# Patient Record
Sex: FEMALE | URBAN - METROPOLITAN AREA
[De-identification: names, ages, dates, MRNs, and addresses within clinical notes are randomized per-mention and may not be internally consistent; named-entity substitution may affect disease eponyms.]

---

## 2018-05-08 ENCOUNTER — AMBULATORY - HEALTHEAST (OUTPATIENT)
Dept: MULTI SPECIALTY CLINIC | Facility: CLINIC | Age: 38
End: 2018-05-08

## 2018-05-08 LAB
HPV_EXT - HISTORICAL: NORMAL
PAP SMEAR - HIM PATIENT REPORTED: NORMAL

## 2019-02-08 ENCOUNTER — RECORDS - HEALTHEAST (OUTPATIENT)
Dept: ADMINISTRATIVE | Facility: OTHER | Age: 39
End: 2019-02-08

## 2019-06-28 ENCOUNTER — OFFICE VISIT - HEALTHEAST (OUTPATIENT)
Dept: FAMILY MEDICINE | Facility: CLINIC | Age: 39
End: 2019-06-28

## 2019-06-28 ENCOUNTER — COMMUNICATION - HEALTHEAST (OUTPATIENT)
Dept: TELEHEALTH | Facility: CLINIC | Age: 39
End: 2019-06-28

## 2019-06-28 DIAGNOSIS — E66.3 OVERWEIGHT: ICD-10-CM

## 2019-06-28 DIAGNOSIS — R06.83 SNORING: ICD-10-CM

## 2019-06-28 DIAGNOSIS — F17.219 CIGARETTE NICOTINE DEPENDENCE WITH NICOTINE-INDUCED DISORDER: ICD-10-CM

## 2019-06-28 DIAGNOSIS — N39.41 URGE INCONTINENCE OF URINE: ICD-10-CM

## 2019-06-28 DIAGNOSIS — F33.1 MAJOR DEPRESSIVE DISORDER, RECURRENT EPISODE, MODERATE (H): ICD-10-CM

## 2019-06-28 DIAGNOSIS — J34.89 SORE IN NOSTRIL: ICD-10-CM

## 2019-06-28 LAB
ALBUMIN UR-MCNC: NEGATIVE MG/DL
APPEARANCE UR: CLEAR
BILIRUB UR QL STRIP: NEGATIVE
COLOR UR AUTO: YELLOW
GLUCOSE UR STRIP-MCNC: NEGATIVE MG/DL
HGB UR QL STRIP: NEGATIVE
KETONES UR STRIP-MCNC: NEGATIVE MG/DL
LEUKOCYTE ESTERASE UR QL STRIP: NEGATIVE
NITRATE UR QL: NEGATIVE
PH UR STRIP: 7 [PH] (ref 5–8)
SP GR UR STRIP: 1.02 (ref 1–1.03)
UROBILINOGEN UR STRIP-ACNC: NORMAL

## 2019-06-28 RX ORDER — MUPIROCIN 20 MG/G
OINTMENT TOPICAL
Qty: 22 G | Refills: 0 | Status: SHIPPED | OUTPATIENT
Start: 2019-06-28

## 2019-06-28 ASSESSMENT — MIFFLIN-ST. JEOR: SCORE: 1414.67

## 2019-07-02 ENCOUNTER — COMMUNICATION - HEALTHEAST (OUTPATIENT)
Dept: FAMILY MEDICINE | Facility: CLINIC | Age: 39
End: 2019-07-02

## 2019-07-10 ENCOUNTER — RECORDS - HEALTHEAST (OUTPATIENT)
Dept: ADMINISTRATIVE | Facility: OTHER | Age: 39
End: 2019-07-10

## 2019-07-19 ENCOUNTER — COMMUNICATION - HEALTHEAST (OUTPATIENT)
Dept: FAMILY MEDICINE | Facility: CLINIC | Age: 39
End: 2019-07-19

## 2019-07-19 DIAGNOSIS — F33.1 MAJOR DEPRESSIVE DISORDER, RECURRENT EPISODE, MODERATE (H): ICD-10-CM

## 2019-07-24 ENCOUNTER — OFFICE VISIT - HEALTHEAST (OUTPATIENT)
Dept: FAMILY MEDICINE | Facility: CLINIC | Age: 39
End: 2019-07-24

## 2019-07-24 DIAGNOSIS — J06.9 ACUTE UPPER RESPIRATORY INFECTION: ICD-10-CM

## 2019-09-12 ENCOUNTER — COMMUNICATION - HEALTHEAST (OUTPATIENT)
Dept: FAMILY MEDICINE | Facility: CLINIC | Age: 39
End: 2019-09-12

## 2019-09-20 ENCOUNTER — OFFICE VISIT - HEALTHEAST (OUTPATIENT)
Dept: SLEEP MEDICINE | Facility: CLINIC | Age: 39
End: 2019-09-20

## 2019-09-20 DIAGNOSIS — F17.219 CIGARETTE NICOTINE DEPENDENCE WITH NICOTINE-INDUCED DISORDER: ICD-10-CM

## 2019-09-20 DIAGNOSIS — R06.83 SNORING: ICD-10-CM

## 2019-09-20 DIAGNOSIS — F12.10 MARIJUANA ABUSE: ICD-10-CM

## 2019-09-20 DIAGNOSIS — G47.10 EXCESSIVE SLEEPINESS: ICD-10-CM

## 2019-09-20 DIAGNOSIS — R06.81 WITNESSED APNEIC SPELLS: ICD-10-CM

## 2019-09-20 RX ORDER — ZOLPIDEM TARTRATE 5 MG/1
TABLET ORAL
Qty: 1 TABLET | Refills: 0 | Status: SHIPPED | OUTPATIENT
Start: 2019-09-20

## 2019-09-20 ASSESSMENT — MIFFLIN-ST. JEOR: SCORE: 1410.59

## 2019-09-30 ENCOUNTER — RECORDS - HEALTHEAST (OUTPATIENT)
Dept: GENERAL RADIOLOGY | Facility: CLINIC | Age: 39
End: 2019-09-30

## 2019-09-30 ENCOUNTER — OFFICE VISIT - HEALTHEAST (OUTPATIENT)
Dept: FAMILY MEDICINE | Facility: CLINIC | Age: 39
End: 2019-09-30

## 2019-09-30 DIAGNOSIS — B36.0 TINEA VERSICOLOR: ICD-10-CM

## 2019-09-30 DIAGNOSIS — E66.09 CLASS 1 OBESITY DUE TO EXCESS CALORIES WITHOUT SERIOUS COMORBIDITY WITH BODY MASS INDEX (BMI) OF 30.0 TO 30.9 IN ADULT: ICD-10-CM

## 2019-09-30 DIAGNOSIS — E03.8 SUBCLINICAL HYPOTHYROIDISM: ICD-10-CM

## 2019-09-30 DIAGNOSIS — F33.1 MAJOR DEPRESSIVE DISORDER, RECURRENT EPISODE, MODERATE (H): ICD-10-CM

## 2019-09-30 DIAGNOSIS — R04.2 HEMOPTYSIS: ICD-10-CM

## 2019-09-30 DIAGNOSIS — F17.219 CIGARETTE NICOTINE DEPENDENCE WITH NICOTINE-INDUCED DISORDER: ICD-10-CM

## 2019-09-30 DIAGNOSIS — E66.811 CLASS 1 OBESITY DUE TO EXCESS CALORIES WITHOUT SERIOUS COMORBIDITY WITH BODY MASS INDEX (BMI) OF 30.0 TO 30.9 IN ADULT: ICD-10-CM

## 2019-09-30 LAB — TSH SERPL DL<=0.005 MIU/L-ACNC: 1.55 UIU/ML (ref 0.3–5)

## 2019-09-30 RX ORDER — KETOCONAZOLE 20 MG/G
CREAM TOPICAL
Qty: 60 G | Refills: 0 | Status: SHIPPED | OUTPATIENT
Start: 2019-09-30

## 2019-09-30 ASSESSMENT — ANXIETY QUESTIONNAIRES
GAD7 TOTAL SCORE: 9
IF YOU CHECKED OFF ANY PROBLEMS ON THIS QUESTIONNAIRE, HOW DIFFICULT HAVE THESE PROBLEMS MADE IT FOR YOU TO DO YOUR WORK, TAKE CARE OF THINGS AT HOME, OR GET ALONG WITH OTHER PEOPLE: SOMEWHAT DIFFICULT
7. FEELING AFRAID AS IF SOMETHING AWFUL MIGHT HAPPEN: SEVERAL DAYS
6. BECOMING EASILY ANNOYED OR IRRITABLE: MORE THAN HALF THE DAYS
2. NOT BEING ABLE TO STOP OR CONTROL WORRYING: SEVERAL DAYS
3. WORRYING TOO MUCH ABOUT DIFFERENT THINGS: SEVERAL DAYS
5. BEING SO RESTLESS THAT IT IS HARD TO SIT STILL: NOT AT ALL
4. TROUBLE RELAXING: MORE THAN HALF THE DAYS
1. FEELING NERVOUS, ANXIOUS, OR ON EDGE: MORE THAN HALF THE DAYS

## 2019-09-30 ASSESSMENT — PATIENT HEALTH QUESTIONNAIRE - PHQ9: SUM OF ALL RESPONSES TO PHQ QUESTIONS 1-9: 9

## 2019-11-30 ENCOUNTER — RECORDS - HEALTHEAST (OUTPATIENT)
Dept: SLEEP MEDICINE | Facility: CLINIC | Age: 39
End: 2019-11-30

## 2019-11-30 ENCOUNTER — RECORDS - HEALTHEAST (OUTPATIENT)
Dept: ADMINISTRATIVE | Facility: OTHER | Age: 39
End: 2019-11-30

## 2019-11-30 DIAGNOSIS — R06.83 SNORING: ICD-10-CM

## 2019-12-04 ENCOUNTER — COMMUNICATION - HEALTHEAST (OUTPATIENT)
Dept: SLEEP MEDICINE | Facility: CLINIC | Age: 39
End: 2019-12-04

## 2020-01-13 ENCOUNTER — OFFICE VISIT - HEALTHEAST (OUTPATIENT)
Dept: SLEEP MEDICINE | Facility: CLINIC | Age: 40
End: 2020-01-13

## 2020-01-13 DIAGNOSIS — G25.81 WILLIS-EKBOM SYNDROME: ICD-10-CM

## 2020-01-13 DIAGNOSIS — G47.10 HYPERSOMNIA: ICD-10-CM

## 2020-01-13 DIAGNOSIS — R06.83 SNORING: ICD-10-CM

## 2020-01-13 DIAGNOSIS — R79.9 ABNORMAL BLOOD CHEMISTRY: ICD-10-CM

## 2020-01-13 ASSESSMENT — MIFFLIN-ST. JEOR: SCORE: 1409.23

## 2020-02-07 ENCOUNTER — COMMUNICATION - HEALTHEAST (OUTPATIENT)
Dept: FAMILY MEDICINE | Facility: CLINIC | Age: 40
End: 2020-02-07

## 2020-02-07 DIAGNOSIS — F33.1 MAJOR DEPRESSIVE DISORDER, RECURRENT EPISODE, MODERATE (H): ICD-10-CM

## 2020-04-23 ENCOUNTER — COMMUNICATION - HEALTHEAST (OUTPATIENT)
Dept: FAMILY MEDICINE | Facility: CLINIC | Age: 40
End: 2020-04-23

## 2020-04-27 ENCOUNTER — OFFICE VISIT - HEALTHEAST (OUTPATIENT)
Dept: FAMILY MEDICINE | Facility: CLINIC | Age: 40
End: 2020-04-27

## 2020-04-27 DIAGNOSIS — F33.1 MAJOR DEPRESSIVE DISORDER, RECURRENT EPISODE, MODERATE (H): ICD-10-CM

## 2020-04-27 RX ORDER — BUPROPION HYDROCHLORIDE 150 MG/1
150 TABLET ORAL EVERY MORNING
Qty: 90 TABLET | Refills: 4 | Status: SHIPPED | OUTPATIENT
Start: 2020-04-27

## 2020-04-27 ASSESSMENT — PATIENT HEALTH QUESTIONNAIRE - PHQ9: SUM OF ALL RESPONSES TO PHQ QUESTIONS 1-9: 11

## 2020-10-16 ENCOUNTER — COMMUNICATION - HEALTHEAST (OUTPATIENT)
Dept: FAMILY MEDICINE | Facility: CLINIC | Age: 40
End: 2020-10-16

## 2020-10-16 DIAGNOSIS — F33.1 MAJOR DEPRESSIVE DISORDER, RECURRENT EPISODE, MODERATE (H): ICD-10-CM

## 2021-05-26 ASSESSMENT — PATIENT HEALTH QUESTIONNAIRE - PHQ9
SUM OF ALL RESPONSES TO PHQ QUESTIONS 1-9: 11
SUM OF ALL RESPONSES TO PHQ QUESTIONS 1-9: 9

## 2021-05-28 ASSESSMENT — ANXIETY QUESTIONNAIRES: GAD7 TOTAL SCORE: 9

## 2021-05-30 NOTE — PATIENT INSTRUCTIONS - HE
To help with sleep and hot flashes at night- try switching to wellbutrin XL once a day in the  Morning.      Antibiotic ointment to right nostril until gone twice a day.  If still bothering you send me a [employer] message and will get you in with ENT.    Check urine and if okay consider pill for overactive bladder like Ditropan XL.

## 2021-05-30 NOTE — PROGRESS NOTES
[facility] Office Visit    Chief Complaint:  Chief Complaint   Patient presents with     [facility]     sleep trouble, skin tag in right nostil         Assessment/Plan:  1. Major depressive disorder, recurrent episode, moderate (H)  Currently on wellbutrin SR 150mg two times a day and taking 2nd dose at bedtime but often forgetting.  Having difficulty maintaining sleep and night sweats.  Start by switching to wellbutrin xl 150mg daily in am for smoother med dosing to see if this will help with sleep maintenance and night sweats and smoking cessation.  Sleep consult regarding sleep apnea.    - buPROPion (WELLBUTRIN XL) 150 MG 24 hr tablet; Take 1 tablet (150 mg total) by mouth every morning.  Dispense: 90 tablet; Refill: 1    2. Urge incontinence of urine  Check ua for any issues that may be contributing. If normal consider meds such as ditropan, etc and f/u in 3 months.    - Urinalysis-UC if Indicated    3. Snoring  Concerning for VIRY with paused breathing pattern as reported by her boyfriend, interrupted sleep, etc.    - Ambulatory referral to Sleep Medicine    4. Sore in nostril  Nothing obvious on exam today.  Trial of bactroban two times a day until tube is gone and if sensation continues ENT referral for direct visualization.    - mupirocin (BACTROBAN) 2 % ointment; Apply to right nostril two times a day  Dispense: 22 g; Refill: 0    5. Cigarette nicotine dependence with nicotine-induced disorder  Pt trying to quit but hard.  Switch wellbutrin to xL for daily consistent dosing and improved adherence.  F/u in 3 months.        Return in about 3 months (around 9/28/2019) for Recheck.  The following are part of a depression follow up plan for the patient:  implementation of measures to provide psychological support  The following high BMI interventions were performed this visit: encouragement to exercise and lifestyle education regarding diet  I have counseled the patient for tobacco  "cessation and the follow up will occur  at the next visit.    Patient Education/AVS:  Patient Instructions   To help with sleep and hot flashes at night- try switching to wellbutrin XL once a day in the  Morning.      Antibiotic ointment to right nostril until gone twice a day.  If still bothering you send me a mychart message and will get you in with ENT.    Check urine and if okay consider pill for overactive bladder like Ditropan XL.       HPI:   Herminia Cheng is a 39 y.o. female c/o [facility] care.  Concerned about urinary incontinence, night sweats, sleep and skin tag in nose.      Mental health is \"okay\"- bad at taking     Gets sudden urges to urinate and sometimes can breath through it and it will pass and other times she can't wait and has to run to the bathroom as her bladder starts to empty.  Usually just a little wet underwear as she goes to the bathroom but sometimes has emptied her entire bladder.  Worse when under more stress.  Getting worse over past year.  Never been pregnant.  Has to get up several times a night to pee and when she wakes up her shirt is soaked with sweat.  Finance has commented that she has always been a snorer but lately starting to stop/pause breathing.      Feels like there is a bump in right nostril- sore and irritated for past several months.  Will heal up and then there will always be a little bump and then it seems to get more irritated and then will flake off.  No bleeding but feels raw/painful/stinging sensation with any manipulation of her nose, blowing nose, sneezing, etc. Not in healthcare work.      ROS:  Constitutional, CV, Resp, GI, , MSK, skin, neuro, psych all negative except as outlined in the HPI above and patient denies any other symptoms.      Old Records-1:Care Everywhere consent signed and records obtained  Lab tests reviewed-1: last pap 2015    Physical Exam:  /76 (Patient Site: Left Arm, Patient Position: Sitting, Cuff Size: Adult Regular)   " "Pulse 80   Resp 16   Ht 5' 3.25\" (1.607 m)   Wt 169 lb (76.7 kg)   LMP 06/11/2019 (Exact Date)   BMI 29.70 kg/m   Body mass index is 29.7 kg/m . Patient's last menstrual period was 06/11/2019 (exact date).  Vital signs reviewed  Wt Readings from Last 3 Encounters:   06/28/19 169 lb (76.7 kg)     Social History     Tobacco Use   Smoking Status Current Every Day Smoker     Packs/day: 0.50     Types: Cigarettes   Smokeless Tobacco Never Used     Social History     Substance and Sexual Activity   Sexual Activity Yes     Partners: Male     Birth control/protection: Rhythm     No data recorded  PHQ-9 Total Score: 10 (6/28/2019 10:32 AM)    PHQ-2 Total Score: 2 (6/28/2019 10:32 AM)    No data recorded    All normal as below except abnormalities include: pt appears well.  Good eye contact and normal affect and mood noted today.  Engaged in history and planning.  No obvious sore or lump in right nostril noted on visualization today.  Remainder of exam grossly normal.    General is a  39 y.o. female sitting comfortably in no apparent distress.   HEENT:  TM are clear bilaterally.  Eye, nasal, oral exams within normal   Neck: Supple without lymphadenopathy or thyromegally  CV: Regular rate and rhythm S1S2 without rubs, murmurs or gallops,   Lungs: Clear to auscultation bilaterally  Extremities: Warm, No Edema, 2+ Pedal and radial pulses bilaterally  Skin: No lesions or rashes noted  Neuro/MSK: Able to ambulate around the exam room with equal movement, strength and normal coordination of the upper and lower extremeties symmetrically    No results found for this or any previous visit.    Med list and active problem list reviewed and updated as part of this encounter    Current Outpatient Medications on File Prior to Visit   Medication Sig Dispense Refill     sertraline (ZOLOFT) 50 MG tablet        [DISCONTINUED] buPROPion (WELLBUTRIN SR) 150 MG 12 hr tablet Take 150 mg by mouth.       No current facility-administered " medications on file prior to visit.          Caitlin Gong MD

## 2021-06-01 NOTE — PROGRESS NOTES
"Dear  Caitlin Gong Md  [address] [place] [id number]    Thank you for the opportunity to participate in the care of  Herminia Cheng.    Herminia Cheng is sent by Caitlin Gong MD for a sleep consultation regarding snoring and witnessed apneas.     She has a history of depression, subclinical hypothyroidism, and nicotine use.    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  Alarm is set for 5:30 - 6:30 but not up until 7 - 8 AM.  Usually getting into bed by 11:30 (aiming for 10:30 PM) and asleep quickly (within 15 - 20 minutes).     On weekends in bed around midnight and up around 10 AM and feels better.    Sleep Disordered Breathing - Snoring is loud. Patient confirms snort arousals and witnessed apneas.   Has nocturia 2 - 3+ times per night.  Occasional nocturnal GERD.   Upon waking feels tired.     Patient was counseled on the importance of driving while alert, to pull over if drowsy, or nap before getting into the vehicle if sleepy.    Movement/Behaviors - Nonsense somniloquy.  Childhood somnambulism.  No sleep related eating.  No dream enactment behavior.   She reports bruxism.    Patient denies typical restless legs syndrome symptoms.    Alcohol use - 1 - 2 nights per week will drink. Anywhere from 2 -3 to as many as 6 drinks.  Caffeine intake - coffee 1 - 2 /day. Last caffeine intake is usually in the morning.  Tobacco exposure - 1/2 ppd  Illicit substances - Daily and \"all day.\"  Rare mushrooms.    Lives with fiance and roommate.  Has 2 pets, dog and cat.     Does have family history of snoring and Obstructive Sleep Apnea \"on whole dad's side.\"    Past Medical History:  Past Medical History:   Diagnosis Date     Thyroid nodule 8/30/2016    Ultrasound and bx were wnl       Problem List:  Patient Active Problem List    Diagnosis Date Noted     Urge incontinence of urine 06/28/2019     Snoring 06/28/2019     Nicotine dependence 06/28/2019     Overweight 06/28/2019 "     Major depressive disorder, recurrent episode, moderate (H) 02/13/2018     Subclinical hypothyroidism 08/25/2016     Hemorrhoids, internal 08/18/2016        Past Surgical History:  Past Surgical History:   Procedure Laterality Date     APPENDECTOMY  1993        Meds:  Current Outpatient Medications   Medication Sig Dispense Refill     buPROPion (WELLBUTRIN XL) 150 MG 24 hr tablet Take 1 tablet (150 mg total) by mouth every morning. 90 tablet 1     mupirocin (BACTROBAN) 2 % ointment Apply to right nostril two times a day 22 g 0     sertraline (ZOLOFT) 50 MG tablet Take 1 tablet (50 mg total) by mouth daily. 90 tablet 1     No current facility-administered medications for this visit.         Allergies:  Patient has no known allergies.     Social History:  Social History     Socioeconomic History     Marital status: Single     Spouse name: Not on file     Number of children: 0     Years of education: Not on file     Highest education level: Not on file   Occupational History     Occupation:  at construction company   Social Needs     Financial resource strain: Not very hard     Food insecurity:     Worry: Never true     Inability: Never true     Transportation needs:     Medical: No     Non-medical: No   Tobacco Use     Smoking status: Current Every Day Smoker     Packs/day: 0.50     Types: Cigarettes     Smokeless tobacco: Never Used   Substance and Sexual Activity     Alcohol use: Not Currently     Drug use: Yes     Sexual activity: Yes     Partners: Male     Birth control/protection: Rhythm   Lifestyle     Physical activity:     Days per week: Not on file     Minutes per session: Not on file     Stress: Not on file   Relationships     Social connections:     Talks on phone: Not on file     Gets together: Not on file     Attends [employer]ism service: Not on file     Active member of club or organization: Not on file     Attends meetings of clubs or organizations: Not on file     Relationship status: Not  "on file     Intimate partner violence:     Fear of current or ex partner: Not on file     Emotionally abused: Not on file     Physically abused: Not on file     Forced sexual activity: Not on file   Other Topics Concern     Not on file   Social History Narrative    Lives with fiance and roomate        Family History:  Family History   Problem Relation Age of Onset     Diabetes Mother      Memory loss Mother      Hyperlipidemia Father      Mental illness Father      Fibromyalgia Sister      Migraines Sister      No Medical Problems Brother      Stroke Maternal Grandmother      No Medical Problems Sister         Review of Systems: Weight gain; cough; diarrhea; constipation; excessive urination; rashes  A complete review of systems reviewed by me is negative with the exeption of what has been mentioned in the history of present illness.    Physical Exam:  /72 (Patient Site: Right Arm, Patient Position: Sitting, Cuff Size: Adult Regular)   Pulse 88   Ht 5' 3.25\" (1.607 m)   Wt 168 lb 1.6 oz (76.2 kg)   SpO2 98%   BMI 29.54 kg/m    General appearance: No apparent distress, well groomed.    HEENT:   Head: Normocephalic, atraumatic.  Eyes: PERRL  Nose: Nares patent.  No exudate.  No septal deviation noted.  Mouth: Teeth: Normal   Tongue: Normal  Oropharynx:  Mallampati Classification: 2+ B    Tonsils: Normal    Uvula: Normal    Neck: Supple without bruit.  Thyroid prominence (2016 US with small nodules)    Cardiac: Regular rate and rhythm.  No murmurs.  Radial pulses are strong and symmetric.  Pulmonary: Symmetric air movement, lungs clear to auscultation bilaterally.  Musculoskeletal: No edema noted.  No clubbing or cyanosis.  Skin: Warm, dry, intact.  Neurologic: Alert and oriented to person, place and time   Gait is normal.  Psychiatric: Affect pleasant.  Mood down.     Labs/Studies:  No results found for: WBC, HGB, HCT, MCV, PLT      Chemistry    No results found for: NA, K, CL, CO2, BUN, CREATININE, GLU No " results found for: CALCIUM, ALKPHOS, AST, ALT, BILITOT     No results found for: FERRITIN  No results found for: TSH  No results found for: HGBA1C    Epworths Sleepiness Scale 9/20/2019   Sitting and reading 3   Watching TV 2   Sitting, inactive in a public place (e.g. a theatre or a meeting) 1   As a passenger in a car for an hour without a break 3   Lying down to rest in the afternoon when circumstances permit 2   Sitting and talking to someone 0   Sitting quietly after a lunch without alcohol 2   In a car, while stopped for a few minutes in traffic 0   Total score 13     STOP BANG 9/20/2019   Do you snore loudly (louder than talking or loud enough to be heard through closed doors)? 1   Do you often feel tired, fatigued, or sleepy during daytime? 1   Has anyone observed you stop breathing in your sleep? 1   Do you have or are you being treated for high blood pressure? 0   BMI more than 35 kg/m2 0   Age over 50 years old? 0   Neck circumference greater than 16 inches? 0   Gender male? 0   Total Score 3     Assessment and Plan:  1. Snoring  I have a high suspicion for VIRY based on presence of snoring, witnessed apneas and ESS. We discussed pathophysiology of obstructive sleep apnea, testing with overnight polysomnography, and treatment modalities (CPAP only discussed at this visit). We discussed consequences of untreated Obstructive Sleep Apnea, such as markedly elevated risk for heart attack or stroke. Patient is interested in treatment and willing to undergo overnight sleep testing.    Home sleep testing is inappropriate for this patient due to lack of high pretest probability for moderate to severe sleep apnea.    Study has been ordered today.      In case of insomnia during the study:  one-time medication has been ordered.   - Split Night Sleep Study; Future  - zolpidem (AMBIEN) 5 MG tablet; Do not take this at home.  For use if needed during the sleep study. May take one tablet by mouth as directed once you are  in the Sleep Lab.  Dispense: 1 tablet; Refill: 0    2. Witnessed apneic spells  Concern for VIRY per above    3. Excessive sleepiness  Some insufficient sleep, and circadian misalignment with possible VIRY.    4. Cigarette nicotine dependence with nicotine-induced disorder  We discussed the importance of tobacco cessation, both due to overall health consequences, and also as how it relates to exacerbation of VIRY severity.  We discussed cessation strategies.  She was encouraged to discuss further strategies with her primary care provider.    5. Marijuana abuse  Discussed MJ effects on depression and weight as well as inflammatory nature of inhaled burning smoke and effects on airway patency.     Patient verbalized understanding of these issues, agrees with the plan and all questions were answered today. Patient was given an opportuntity to voice any other symptoms or concerns not listed above. Patient did not have any other symptoms or concerns.      MD TONI Shin Board Certified in Internal Medicine and Sleep Medicine  [facility].

## 2021-06-01 NOTE — PROGRESS NOTES
[facility] Office Visit    Chief Complaint:  Chief Complaint   Patient presents with     Follow-up     Rash     on back, comes and goes         Assessment/Plan:  1. Major depressive disorder, recurrent episode, moderate (H)  Moderately well controlled.  Continue meds and cognitive tx. Work on smoking cessation first and then THC   - buPROPion (WELLBUTRIN XL) 150 MG 24 hr tablet; Take 1 tablet (150 mg total) by mouth every morning.  Dispense: 90 tablet; Refill: 4  - sertraline (ZOLOFT) 50 MG tablet; Take 1 tablet (50 mg total) by mouth daily.  Dispense: 90 tablet; Refill: 4    2. Hemoptysis  Resolved.  Likely related to prolonged period of coughing due to viral illness vs mold exposure. Check CXR and if normal continue to monitor.    - XR Chest 2 Views; Future    3. Cigarette nicotine dependence with nicotine-induced disorder  Pt advised to quit smoking. She has set quit date for 11/1/19.  Continue wellbutrin.     4. Class 1 obesity due to excess calories without serious comorbidity with body mass index (BMI) of 30.0 to 30.9 in adult  Weight gain noted.  Pt will continue to work on health LSM and careful about eating to compensate for smoking.      5. Subclinical hypothyroidism  Still tired and anxious and weight gain- check TSH   - Thyroid Cascade    6. Tinea versicolor  Rash on back c/w tinea.  Treat as below.    - ketoconazole (NIZORAL) 2 % cream; Apply to upper back daily for 3 weeks  Dispense: 60 g; Refill: 0      Return in about 6 months (around 3/30/2020) for Recheck.  The following are part of a depression follow up plan for the patient:  implementation of measures to provide psychological support  The following high BMI interventions were performed this visit: encouragement to exercise and lifestyle education regarding diet  I have counseled the patient for tobacco cessation and the follow up will occur  at the next visit.    Patient Education/AVS:  There are no Patient Instructions on  "file for this visit.    HPI:   Herminia Cheng is a 39 y.o. female c/o f/u on mental health, recurrent cough and nicotine and THC use.  Rash on back- started early 2019.  Looks like hives that goes away and then turns in to white spots and comes back again.  Get itchy at times when red and raised and will last for 1-2 days.  Has occurred about 8 times when they get red/raised.  Only on upper back between shoulders.      Pt has had recurrent URI and cough on and off for past several months.  Does work in a basement office with some mold and did ask to move upstairs and cough did get better.  Wondering if rash may be related to this mold as well?  Had been coughing up blood tinged mucous but notes she had a very hard cough.      Sleep study scheduled next month but may have to reschedule to a change in jobs.     Mental health going \"alright\".  Forgetting to take meds 1-2 days a week on weekends.  Still getting night sweats but not as much now.  Can quit up to 4 days and has a quit date of 11/1/19.  Daily THC use.  Seeing therapist on regular basis.  Worried about what her anxiety will be off this med.      ROS:  Constitutional, CV, Resp, GI, , MSK, skin, neuro, psych all negative except as outlined in the HPI above and patient denies any other symptoms.      History summarized from1-2:sleep consult 9/20/19 reviewed- dx with snoring concerning for VIRY.  Home sleep test ordered.  Advised on smoking and THC cessation.    Labs- normal TSH 2/2019    Physical Exam:  /86 (Patient Site: Left Arm, Patient Position: Sitting, Cuff Size: Adult Large)   Pulse 69   Resp 16   Wt 172 lb (78 kg)   LMP 09/22/2019 (Exact Date)   SpO2 98%   BMI 30.23 kg/m   Body mass index is 30.23 kg/m . Patient's last menstrual period was 09/22/2019 (exact date).  Vital signs reviewed  Wt Readings from Last 3 Encounters:   09/30/19 172 lb (78 kg)   09/20/19 168 lb 1.6 oz (76.2 kg)   06/28/19 169 lb (76.7 kg)     Social History     Tobacco " Use   Smoking Status Current Every Day Smoker     Packs/day: 0.50     Types: Cigarettes   Smokeless Tobacco Never Used     Social History     Substance and Sexual Activity   Sexual Activity Yes     Partners: Male     Birth control/protection: Rhythm     No data recorded  PHQ-9 Total Score: 10 (6/28/2019 10:32 AM)    PHQ-2 Total Score: 2 (6/28/2019 10:32 AM)    No data recorded    All normal as below except abnormalities include: grossly normal exam today.  Good eye contact and affect noted.    General is a  39 y.o. female sitting comfortably in no apparent distress.   Neck: Supple without lymphadenopathy or thyromegally  CV: Regular rate and rhythm S1S2 without rubs, murmurs or gallops,   Lungs: Clear to auscultation bilaterally  Extremities: Warm, No Edema, 2+ Pedal and radial pulses bilaterally  Skin: No lesions or rashes noted  Neuro/MSK: Able to ambulate around the exam room with equal movement, strength and normal coordination of the upper and lower extremeties symmetrically    Results for orders placed or performed in visit on 06/28/19   Urinalysis-UC if Indicated   Result Value Ref Range    Color, UA Yellow Colorless, Yellow, Straw, Light Yellow    Clarity, UA Clear Clear    Glucose, UA Negative Negative    Bilirubin, UA Negative Negative    Ketones, UA Negative Negative    Specific Gravity, UA 1.020 1.005 - 1.030    Blood, UA Negative Negative    pH, UA 7.0 5.0 - 8.0    Protein, UA Negative Negative mg/dL    Urobilinogen, UA 0.2 E.U./dL 0.2 E.U./dL, 1.0 E.U./dL    Nitrite, UA Negative Negative    Leukocytes, UA Negative Negative       Med list and active problem list reviewed and updated as part of this encounter    Current Outpatient Medications on File Prior to Visit   Medication Sig Dispense Refill     mupirocin (BACTROBAN) 2 % ointment Apply to right nostril two times a day 22 g 0     zolpidem (AMBIEN) 5 MG tablet Do not take this at home.  For use if needed during the sleep study. May take one tablet by  mouth as directed once you are in the Sleep Lab. 1 tablet 0     [DISCONTINUED] buPROPion (WELLBUTRIN XL) 150 MG 24 hr tablet Take 1 tablet (150 mg total) by mouth every morning. 90 tablet 1     [DISCONTINUED] sertraline (ZOLOFT) 50 MG tablet Take 1 tablet (50 mg total) by mouth daily. 90 tablet 1     No current facility-administered medications on file prior to visit.          Caitlin Gong MD

## 2021-06-03 VITALS
DIASTOLIC BLOOD PRESSURE: 86 MMHG | RESPIRATION RATE: 16 BRPM | OXYGEN SATURATION: 98 % | WEIGHT: 172 LBS | BODY MASS INDEX: 30.23 KG/M2 | HEART RATE: 69 BPM | SYSTOLIC BLOOD PRESSURE: 116 MMHG

## 2021-06-03 VITALS — WEIGHT: 169 LBS | HEIGHT: 63 IN | BODY MASS INDEX: 29.95 KG/M2

## 2021-06-03 VITALS
HEART RATE: 88 BPM | HEIGHT: 63 IN | WEIGHT: 168.1 LBS | SYSTOLIC BLOOD PRESSURE: 104 MMHG | DIASTOLIC BLOOD PRESSURE: 72 MMHG | BODY MASS INDEX: 29.79 KG/M2 | OXYGEN SATURATION: 98 %

## 2021-06-04 VITALS
BODY MASS INDEX: 29.73 KG/M2 | HEIGHT: 63 IN | HEART RATE: 82 BPM | DIASTOLIC BLOOD PRESSURE: 78 MMHG | SYSTOLIC BLOOD PRESSURE: 110 MMHG | WEIGHT: 167.8 LBS | OXYGEN SATURATION: 98 %

## 2021-06-04 NOTE — TELEPHONE ENCOUNTER
Overnight polysomnography was reviewed.   The patient had mild snoring and periodic limb movement in sleep. I will wait until her upcoming appointment to discuss results and treatment options.     Thank you for allowing me to participate in the care of this patient. If you have any questions or concerns, please feel free to call me.

## 2021-06-07 NOTE — TELEPHONE ENCOUNTER
DOD, Please advise on mychart message below. Patient last seen on 09/30/19. Ok for telephone visit?

## 2021-06-07 NOTE — PROGRESS NOTES
"Herminia Cheng is a 40 y.o. female who is being evaluated via a billable video visit.      The patient has been notified of following:     \"This video visit will be conducted via a call between you and your physician/provider. We have found that certain health care needs can be provided without the need for an in-person physical exam.  This service lets us provide the care you need with a video conversation.  If a prescription is necessary we can send it directly to your pharmacy.  If lab work is needed we can place an order for that and you can then stop by our lab to have the test done at a later time.    Video visits are billed at different rates depending on your insurance coverage. Please reach out to your insurance provider with any questions.    If during the course of the call the physician/provider feels a video visit is not appropriate, you will not be charged for this service.\"    Patient has given verbal consent to a Video visit? Yes    Patient would like to receive their AVS by AVS Preference: Angela.    Patient would like the video invitation sent by: Text to cell phone: [phone]    Will anyone else be joining your video visit? No        Video Start Time: 2:42PM    Additional provider notes:  Patient in her car. Is at work and has to do video visits in car.  Wants to restart depression medications  Quit smoking with wellbutrin. Noticed it helped with depression.   Was wearing off and so increased dose?  Stopped taking both medications- \"outside influences\" giving her shame. Ex-chuck  Had been on wellbutrin and 50mg zofolt- stopped them. Weaned . Started forgetting them. Stopped taking after 3 days.   No side effects.   continueous therapy sessions for 6 years.   Broke up with chuck march 5th  Been on her own for last month.   Friends in the area. No family here.   She pulled the trigger with relationship but it turned out to be mutual  Mood is \"About the same as before taking he medicitons. \" " some days lower due to break up  For the most part feels stable abotu the break up.   emotional hurdles.   anxiety with work.   thoughts of harming herself - reaches out to therapist when this happens. Has had this before. Safety plan in place    ROS: + SI, no HI. + depression and + anxiety. neg for hallucinations     GENERAL: healthy, alert and no distress  EYES: Eyes grossly normal to inspection, conjunctivae and sclerae normal  RESP: no audible wheeze, cough, or visible cyanosis.  No visible retractions or increased work of breathing.  Able to speak fully in complete sentences.  NEURO: Cranial nerves grossly intact, mentation intact and speech normal  PSYCH: tearful a little during conversation when talking about chuck Hope was seen today for medication refill and anxiety.    Diagnoses and all orders for this visit:    Major depressive disorder, recurrent episode, moderate (H):  Will restart patient on her previous medications with plan laid out as below on how to restart. Contracted for safety. Continue therapy. F/u in 3 weeks with PCP or myself with video visit. Patient agreeable to plan.   -     buPROPion (WELLBUTRIN XL) 150 MG 24 hr tablet; Take 1 tablet (150 mg total) by mouth every morning.  -     sertraline (ZOLOFT) 50 MG tablet; Take 1 tablet (50 mg total) by mouth daily. For the first 3-4 days only take half a pill      Video-Visit Details    Type of service:  Video Visit    Video End Time (time video stopped): 2:54 PM  Originating Location (pt. Location): Car outside of work    Distant Location (provider location):  [facility]     Mode of Communication:  Video Conference via [employer]      Shonda Sue DO

## 2021-06-12 NOTE — TELEPHONE ENCOUNTER
Patient declined to schedule appt at this time. She stated that she is currently seeing a Psychiatry provider @ [facility] who will be managing this medication and does not need PCP to refill.  At this time, we will no longer make an attempt to schedule this appointment. Completing task.

## 2021-06-16 PROBLEM — R06.83 SNORING: Status: ACTIVE | Noted: 2019-06-28

## 2021-06-16 PROBLEM — F33.1 MAJOR DEPRESSIVE DISORDER, RECURRENT EPISODE, MODERATE (H): Status: ACTIVE | Noted: 2018-02-13

## 2021-06-16 PROBLEM — F17.200 NICOTINE DEPENDENCE: Status: ACTIVE | Noted: 2019-06-28

## 2021-06-16 PROBLEM — G25.81: Status: ACTIVE | Noted: 2020-01-13

## 2021-06-16 PROBLEM — E66.811 CLASS 1 OBESITY DUE TO EXCESS CALORIES WITHOUT SERIOUS COMORBIDITY WITH BODY MASS INDEX (BMI) OF 30.0 TO 30.9 IN ADULT: Status: ACTIVE | Noted: 2019-06-28

## 2021-06-16 PROBLEM — E66.09 CLASS 1 OBESITY DUE TO EXCESS CALORIES WITHOUT SERIOUS COMORBIDITY WITH BODY MASS INDEX (BMI) OF 30.0 TO 30.9 IN ADULT: Status: ACTIVE | Noted: 2019-06-28

## 2021-06-16 PROBLEM — N39.41 URGE INCONTINENCE OF URINE: Status: ACTIVE | Noted: 2019-06-28

## 2021-06-17 NOTE — PATIENT INSTRUCTIONS - HE
Patient Instructions by Caitlin Gong MD at 7/25/2019  6:12 PM     Author: Caitlin Gong MD Service: -- Author Type: Physician    Filed: 7/25/2019  6:15 PM Encounter Date: 7/24/2019 Status: Addendum    : Caitlin Gong MD (Physician)    Related Notes: Original Note by Caitlin Gong MD (Physician) filed at 7/25/2019  6:12 PM           Viral Upper Respiratory Illness (Adult)  You have a viral upper respiratory illness (URI), which is another term for the common cold. This illness is contagious during the first few days. It is spread through the air by coughing and sneezing. It may also be spread by direct contact (touching the sick person and then touching your own eyes, nose, or mouth). Frequent handwashing will decrease risk of spread. Most viral illnesses go away within 7 to 10 days with rest and simple home remedies. Sometimes the illness may last for several weeks. Antibiotics will not kill a virus, and they are generally not prescribed for this condition.    Home care    If symptoms are severe, rest at home for the first 2 to 3 days. When you resume activity, don't let yourself get too tired.    Avoid being exposed to cigarette smoke (yours or others).    You may use acetaminophen or ibuprofen to control pain and fever, unless another medicine was prescribed. If you have chronic liver or kidney disease, have ever had a stomach ulcer or gastrointestinal bleeding, or are taking blood-thinning medicines, talk with your healthcare provider before using these medicines. Aspirin should never be given to anyone under 18 years of age who is ill with a viral infection or fever. It may cause severe liver or brain damage.    Your appetite may be poor, so a light diet is fine. Avoid dehydration by drinking 6 to 8 glasses of fluids per day (water, soft drinks, juices, tea, or soup). Extra fluids will help loosen secretions in the nose and lungs.    Over-the-counter cold medicines will not  shorten the length of time youre sick, but they may be helpful for the following symptoms: cough, sore throat, and nasal and sinus congestion. (Note: Do not use decongestants if you have high blood pressure.)  Follow-up care  Follow up with your healthcare provider, or as advised.  When to seek medical advice  Call your healthcare provider right away if any of these occur:    Cough with lots of colored sputum (mucus)    Severe headache; face, neck, or ear pain    Difficulty swallowing due to throat pain    Fever of 100.4 F (38 C) or higher, or as directed by your healthcare provider  Call 911  Call 911 if any of these occur:    Chest pain, shortness of breath, wheezing, or difficulty breathing    Coughing up blood    Inability to swallow due to throat pain  Date Last Reviewed: 9/13/2015 2000-2017 [employer]. [address] [place] [id number]. All rights reserved. This information is not intended as a substitute for professional medical care. Always follow your healthcare professional's instructions.          Viral Upper Respiratory Illness (Adult)  You have a viral upper respiratory illness (URI), which is another term for the common cold. This illness is contagious during the first few days. It is spread through the air by coughing and sneezing. It may also be spread by direct contact (touching the sick person and then touching your own eyes, nose, or mouth). Frequent handwashing will decrease risk of spread. Most viral illnesses go away within 7 to 10 days with rest and simple home remedies. Sometimes the illness may last for several weeks. Antibiotics will not kill a virus, and they are generally not prescribed for this condition.    Home care    If symptoms are severe, rest at home for the first 2 to 3 days. When you resume activity, don't let yourself get too tired.    Avoid being exposed to cigarette smoke (yours or others).    You may use acetaminophen or ibuprofen to control pain  and fever, unless another medicine was prescribed. If you have chronic liver or kidney disease, have ever had a stomach ulcer or gastrointestinal bleeding, or are taking blood-thinning medicines, talk with your healthcare provider before using these medicines. Aspirin should never be given to anyone under 18 years of age who is ill with a viral infection or fever. It may cause severe liver or brain damage.    Your appetite may be poor, so a light diet is fine. Avoid dehydration by drinking 6 to 8 glasses of fluids per day (water, soft drinks, juices, tea, or soup). Extra fluids will help loosen secretions in the nose and lungs.    Over-the-counter cold medicines will not shorten the length of time youre sick, but they may be helpful for the following symptoms: cough, sore throat, and nasal and sinus congestion. (Note: Do not use decongestants if you have high blood pressure.)  Follow-up care  Follow up with your healthcare provider, or as advised.  When to seek medical advice  Call your healthcare provider right away if any of these occur:    Cough with lots of colored sputum (mucus)    Severe headache; face, neck, or ear pain    Difficulty swallowing due to throat pain    Fever of 100.4 F (38 C) or higher, or as directed by your healthcare provider  Call 911  Call 911 if any of these occur:    Chest pain, shortness of breath, wheezing, or difficulty breathing    Coughing up blood    Inability to swallow due to throat pain  Date Last Reviewed: 9/13/2015 2000-2017 [employer]. [address] [place] [address], [place] [address]. All rights reserved. This information is not intended as a substitute for professional medical care. Always follow your healthcare professional's instructions.

## 2021-06-17 NOTE — PATIENT INSTRUCTIONS - HE
"Patient Instructions by Francois Cuevas DO at 1/13/2020  9:00 AM     Author: Francois Cuevas DO Service: -- Author Type: Physician    Filed: 1/13/2020  9:16 AM Encounter Date: 1/13/2020 Status: Signed    : Francois Cuevas DO (Physician)         What is restless legs syndrome(Crowder Ekbom Disease)?    Restless legs syndrome, or RLS for short, or restless body syndrome is a condition that causes strange sensations in your legs. RLS is also sometimes called \"Crowder-Ekbom disease.\" If you have RLS, you probably have the urge to kick or move your legs at night. This can wake you up or make it hard to sleep.  In some cases, RLS happens on its own and seems to be passed on in families. In other cases, the condition seems to be linked to other medical problems. For instance, a condition called \"anemia,\" in which there is too little iron in the blood, seems to increase the risk of RLS. Other conditions that increase the risk of RLS include kidney disease, diabetes, and multiple sclerosis. Pregnancy seems to increase a woman's risk of developing RLS, too.  What are the symptoms of RLS? -- People who have RLS get an uncomfortable urge to move their legs when they are at rest. They describe the feeling as crawling, creeping, pulling, or itching. And they say the feeling is deep in the legs - not on the skin - usually below the knees. These symptoms usually get worse as the day moves on, and they are worst at night. But people can make the feeling go away temporarily if they kick or move their legs. Some people with RLS find that their legs move on their own while they are asleep.  In short, the symptoms:  ?Happen when you are at rest  ?Go away if you move your legs on purpose  ?Are worst at night  ?Sometimes include the legs moving on their own during sleep  Together, the symptoms of RLS can make it hard to get a good night's sleep. People with the condition often feel tired during the day.  Is there a test for " "RLS? -- There is a test, but it is not usually necessary. Your doctor or nurse should be able to tell if you have it by asking about your symptoms and doing an exam. Still, it is possible that your doctor or nurse will decide to send you for a \"sleep study,\" to be sure of what is happening.  For a sleep study, you spend the night in a lab, and you are hooked up to different machines that monitor your movements, heart rate, breathing, and other body functions.  Is there anything I can do on my own to feel better? -- Yes. You might feel better if you:  ?Do activities that keep your mind alert during the day, such as crossword puzzles  ?Get moderate regular exercise  ?Message your legs (or have someone massage them)   ?Apply heat to your legs with heating pads or by taking a warm bath  ?Avoid taking medicines that can make RLS worse - These include antihistamines like diphenhydramine (sample brand name: Benadryl).  Should I see a doctor or nurse? -- See your doctor or nurse if your condition bothers you, or if it keeps you from getting a good night's sleep.  How is RLS treated? -- Some people with RLS do not need medicine for it because they have mild symptoms that don't bother them very often. If treatment is needed, there are a number of medicines doctors can suggest. Examples include:  ?Iron supplements  ?Pramipexole (brand name: Mirapex)  ?Ropinirole (brand name: Requip)  ?Rotigotine (brand name: Neupro)   ?Carbidopa-levodopa (brand name: Sinemet)  ?Gabapentin enacarbil (brand name: Horizant)  ?Gabapentin (brand name: Neurontin)  ?Pregabalin (brand name: Lyrica)  In people with RLS who also have a severe form of kidney disease called kidney failure, the RLS might improve with a treatment called hemodialysis (also known as just dialysis).  What if I am pregnant? -- If you are pregnant, you can take iron supplements and try the other tips that do not involve taking prescription medicines. Most of the medicines used " to treat RLS are not safe to take during pregnancy. If your symptoms are bad, there are some medicines that might be OK to take. But keep in mind that the condition usually goes away toward the end of pregnancy.

## 2021-06-19 NOTE — LETTER
Letter by Caitlin Gong MD at      Author: Caitlin Gong MD Service: -- Author Type: --    Filed:  Encounter Date: 7/19/2019 Status: (Other)         July 25, 2019     Patient: Herminia Cheng   YOB: 1980   Date of Visit: 7/19/2019       To Whom It May Concern:    It is my medical opinion that Herminia Cheng should remain out of work until her acute viral illness has resolved.    If you have any questions or concerns, please don't hesitate to call.    Sincerely,        Electronically signed by Caitlin Gong MD

## 2021-06-20 ENCOUNTER — HEALTH MAINTENANCE LETTER (OUTPATIENT)
Age: 41
End: 2021-06-20

## 2021-10-11 ENCOUNTER — HEALTH MAINTENANCE LETTER (OUTPATIENT)
Age: 41
End: 2021-10-11

## 2022-07-17 ENCOUNTER — HEALTH MAINTENANCE LETTER (OUTPATIENT)
Age: 42
End: 2022-07-17

## 2022-08-25 ENCOUNTER — LAB REQUISITION (OUTPATIENT)
Dept: LAB | Facility: CLINIC | Age: 42
End: 2022-08-25

## 2022-08-25 DIAGNOSIS — Z11.3 ENCOUNTER FOR SCREENING FOR INFECTIONS WITH A PREDOMINANTLY SEXUAL MODE OF TRANSMISSION: ICD-10-CM

## 2022-08-25 DIAGNOSIS — Z13.0 ENCOUNTER FOR SCREENING FOR DISEASES OF THE BLOOD AND BLOOD-FORMING ORGANS AND CERTAIN DISORDERS INVOLVING THE IMMUNE MECHANISM: ICD-10-CM

## 2022-08-25 DIAGNOSIS — Z13.1 ENCOUNTER FOR SCREENING FOR DIABETES MELLITUS: ICD-10-CM

## 2022-08-25 DIAGNOSIS — E04.9 NONTOXIC GOITER, UNSPECIFIED: ICD-10-CM

## 2022-08-25 DIAGNOSIS — Z13.220 ENCOUNTER FOR SCREENING FOR LIPOID DISORDERS: ICD-10-CM

## 2022-08-25 LAB
CHOLEST SERPL-MCNC: 229 MG/DL
ERYTHROCYTE [DISTWIDTH] IN BLOOD BY AUTOMATED COUNT: 13.2 % (ref 10–15)
HBA1C MFR BLD: 5.4 %
HCT VFR BLD AUTO: 43.4 % (ref 35–47)
HDLC SERPL-MCNC: 58 MG/DL
HGB BLD-MCNC: 14.4 G/DL (ref 11.7–15.7)
LDLC SERPL CALC-MCNC: 157 MG/DL
MCH RBC QN AUTO: 31.6 PG (ref 26.5–33)
MCHC RBC AUTO-ENTMCNC: 33.2 G/DL (ref 31.5–36.5)
MCV RBC AUTO: 95 FL (ref 78–100)
NONHDLC SERPL-MCNC: 171 MG/DL
PLATELET # BLD AUTO: 319 10E3/UL (ref 150–450)
RBC # BLD AUTO: 4.55 10E6/UL (ref 3.8–5.2)
TRIGL SERPL-MCNC: 70 MG/DL
TSH SERPL DL<=0.005 MIU/L-ACNC: 2.28 UIU/ML (ref 0.3–4.2)
WBC # BLD AUTO: 10 10E3/UL (ref 4–11)

## 2022-08-25 PROCEDURE — 85027 COMPLETE CBC AUTOMATED: CPT | Performed by: PHYSICIAN ASSISTANT

## 2022-08-25 PROCEDURE — 86803 HEPATITIS C AB TEST: CPT | Performed by: PHYSICIAN ASSISTANT

## 2022-08-25 PROCEDURE — 87389 HIV-1 AG W/HIV-1&-2 AB AG IA: CPT | Performed by: PHYSICIAN ASSISTANT

## 2022-08-25 PROCEDURE — 80061 LIPID PANEL: CPT | Performed by: PHYSICIAN ASSISTANT

## 2022-08-25 PROCEDURE — 83036 HEMOGLOBIN GLYCOSYLATED A1C: CPT | Performed by: PHYSICIAN ASSISTANT

## 2022-08-25 PROCEDURE — 86592 SYPHILIS TEST NON-TREP QUAL: CPT | Performed by: PHYSICIAN ASSISTANT

## 2022-08-25 PROCEDURE — 87340 HEPATITIS B SURFACE AG IA: CPT | Performed by: PHYSICIAN ASSISTANT

## 2022-08-25 PROCEDURE — 84443 ASSAY THYROID STIM HORMONE: CPT | Performed by: PHYSICIAN ASSISTANT

## 2022-08-26 LAB
HBV SURFACE AG SERPL QL IA: NONREACTIVE
HCV AB SERPL QL IA: NONREACTIVE
HIV 1+2 AB+HIV1 P24 AG SERPL QL IA: NONREACTIVE
RPR SER QL: NONREACTIVE

## 2022-09-25 ENCOUNTER — HEALTH MAINTENANCE LETTER (OUTPATIENT)
Age: 42
End: 2022-09-25

## 2023-05-13 ENCOUNTER — HEALTH MAINTENANCE LETTER (OUTPATIENT)
Age: 43
End: 2023-05-13

## 2023-09-29 ENCOUNTER — LAB REQUISITION (OUTPATIENT)
Dept: LAB | Facility: CLINIC | Age: 43
End: 2023-09-29

## 2023-09-29 DIAGNOSIS — Z11.3 ENCOUNTER FOR SCREENING FOR INFECTIONS WITH A PREDOMINANTLY SEXUAL MODE OF TRANSMISSION: ICD-10-CM

## 2023-09-29 PROCEDURE — 87340 HEPATITIS B SURFACE AG IA: CPT | Performed by: OBSTETRICS & GYNECOLOGY

## 2023-09-29 PROCEDURE — 86803 HEPATITIS C AB TEST: CPT | Performed by: OBSTETRICS & GYNECOLOGY

## 2023-09-29 PROCEDURE — 87389 HIV-1 AG W/HIV-1&-2 AB AG IA: CPT | Performed by: OBSTETRICS & GYNECOLOGY

## 2023-09-29 PROCEDURE — 86780 TREPONEMA PALLIDUM: CPT | Performed by: OBSTETRICS & GYNECOLOGY

## 2023-09-30 LAB
HBV SURFACE AG SERPL QL IA: NONREACTIVE
HCV AB SERPL QL IA: NONREACTIVE
T PALLIDUM AB SER QL: NONREACTIVE

## 2023-10-02 LAB — HIV 1+2 AB+HIV1 P24 AG SERPL QL IA: NONREACTIVE

## 2024-03-02 ENCOUNTER — HEALTH MAINTENANCE LETTER (OUTPATIENT)
Age: 44
End: 2024-03-02

## 2024-11-15 ENCOUNTER — LAB REQUISITION (OUTPATIENT)
Dept: LAB | Facility: CLINIC | Age: 44
End: 2024-11-15

## 2024-11-15 DIAGNOSIS — Z12.4 ENCOUNTER FOR SCREENING FOR MALIGNANT NEOPLASM OF CERVIX: ICD-10-CM

## 2024-11-15 DIAGNOSIS — N93.9 ABNORMAL UTERINE AND VAGINAL BLEEDING, UNSPECIFIED: ICD-10-CM

## 2024-11-15 DIAGNOSIS — Z11.3 ENCOUNTER FOR SCREENING FOR INFECTIONS WITH A PREDOMINANTLY SEXUAL MODE OF TRANSMISSION: ICD-10-CM

## 2024-11-15 LAB
ERYTHROCYTE [DISTWIDTH] IN BLOOD BY AUTOMATED COUNT: 12.7 % (ref 10–15)
HCT VFR BLD AUTO: 43.1 % (ref 35–47)
HGB BLD-MCNC: 14 G/DL (ref 11.7–15.7)
HIV 1+2 AB+HIV1 P24 AG SERPL QL IA: NONREACTIVE
MCH RBC QN AUTO: 31.5 PG (ref 26.5–33)
MCHC RBC AUTO-ENTMCNC: 32.5 G/DL (ref 31.5–36.5)
MCV RBC AUTO: 97 FL (ref 78–100)
PLATELET # BLD AUTO: 366 10E3/UL (ref 150–450)
RBC # BLD AUTO: 4.45 10E6/UL (ref 3.8–5.2)
WBC # BLD AUTO: 10.8 10E3/UL (ref 4–11)

## 2024-11-15 PROCEDURE — 87624 HPV HI-RISK TYP POOLED RSLT: CPT | Performed by: OBSTETRICS & GYNECOLOGY

## 2024-11-15 PROCEDURE — 87491 CHLMYD TRACH DNA AMP PROBE: CPT | Performed by: OBSTETRICS & GYNECOLOGY

## 2024-11-16 LAB
C TRACH DNA SPEC QL PROBE+SIG AMP: NEGATIVE
HBV SURFACE AG SERPL QL IA: NONREACTIVE
HCV AB SERPL QL IA: NONREACTIVE
N GONORRHOEA DNA SPEC QL NAA+PROBE: NEGATIVE
T PALLIDUM AB SER QL: NONREACTIVE

## 2024-11-19 LAB
HPV HR 12 DNA CVX QL NAA+PROBE: POSITIVE
HPV16 DNA CVX QL NAA+PROBE: NEGATIVE
HPV18 DNA CVX QL NAA+PROBE: NEGATIVE
HUMAN PAPILLOMA VIRUS FINAL DIAGNOSIS: ABNORMAL

## 2024-11-27 LAB
BKR AP ASSOCIATED HPV REPORT: ABNORMAL
BKR LAB AP GYN ADEQUACY: ABNORMAL
BKR LAB AP GYN INTERPRETATION: ABNORMAL
BKR LAB AP LMP: ABNORMAL
BKR LAB AP PREVIOUS ABNL DX: ABNORMAL
BKR LAB AP PREVIOUS ABNORMAL: ABNORMAL
PATH REPORT.COMMENTS IMP SPEC: ABNORMAL
PATH REPORT.COMMENTS IMP SPEC: ABNORMAL
PATH REPORT.RELEVANT HX SPEC: ABNORMAL

## 2025-06-28 ENCOUNTER — HEALTH MAINTENANCE LETTER (OUTPATIENT)
Age: 45
End: 2025-06-28